# Patient Record
Sex: FEMALE | Race: WHITE | NOT HISPANIC OR LATINO
[De-identification: names, ages, dates, MRNs, and addresses within clinical notes are randomized per-mention and may not be internally consistent; named-entity substitution may affect disease eponyms.]

---

## 2023-01-01 ENCOUNTER — NON-APPOINTMENT (OUTPATIENT)
Age: 78
End: 2023-01-01

## 2023-01-01 ENCOUNTER — APPOINTMENT (OUTPATIENT)
Dept: ORTHOPEDIC SURGERY | Facility: CLINIC | Age: 78
End: 2023-01-01

## 2023-01-01 ENCOUNTER — APPOINTMENT (OUTPATIENT)
Dept: CARDIOLOGY | Facility: CLINIC | Age: 78
End: 2023-01-01
Payer: MEDICARE

## 2023-01-01 ENCOUNTER — APPOINTMENT (OUTPATIENT)
Dept: CARDIOLOGY | Facility: CLINIC | Age: 78
End: 2023-01-01

## 2023-01-01 ENCOUNTER — APPOINTMENT (OUTPATIENT)
Dept: NEUROLOGY | Facility: CLINIC | Age: 78
End: 2023-01-01

## 2023-01-01 ENCOUNTER — APPOINTMENT (OUTPATIENT)
Dept: PODIATRY | Facility: CLINIC | Age: 78
End: 2023-01-01

## 2023-01-01 VITALS
SYSTOLIC BLOOD PRESSURE: 142 MMHG | HEART RATE: 109 BPM | HEIGHT: 66 IN | DIASTOLIC BLOOD PRESSURE: 80 MMHG | BODY MASS INDEX: 28.77 KG/M2 | WEIGHT: 179 LBS | OXYGEN SATURATION: 98 %

## 2023-01-01 VITALS
DIASTOLIC BLOOD PRESSURE: 60 MMHG | BODY MASS INDEX: 29.09 KG/M2 | HEIGHT: 66 IN | WEIGHT: 181 LBS | HEART RATE: 84 BPM | SYSTOLIC BLOOD PRESSURE: 112 MMHG | OXYGEN SATURATION: 96 %

## 2023-01-01 DIAGNOSIS — Z83.3 FAMILY HISTORY OF DIABETES MELLITUS: ICD-10-CM

## 2023-01-01 DIAGNOSIS — I10 ESSENTIAL (PRIMARY) HYPERTENSION: ICD-10-CM

## 2023-01-01 DIAGNOSIS — E78.00 PURE HYPERCHOLESTEROLEMIA, UNSPECIFIED: ICD-10-CM

## 2023-01-01 DIAGNOSIS — G40.909 EPILEPSY, UNSPECIFIED, NOT INTRACTABLE, W/OUT STATUS EPILEPTICUS: ICD-10-CM

## 2023-01-01 DIAGNOSIS — Z82.61 FAMILY HISTORY OF ARTHRITIS: ICD-10-CM

## 2023-01-01 PROCEDURE — 93298 REM INTERROG DEV EVAL SCRMS: CPT

## 2023-01-01 PROCEDURE — 99215 OFFICE O/P EST HI 40 MIN: CPT | Mod: 25

## 2023-01-01 PROCEDURE — G2066: CPT

## 2023-01-01 PROCEDURE — 93000 ELECTROCARDIOGRAM COMPLETE: CPT

## 2023-01-01 PROCEDURE — 99214 OFFICE O/P EST MOD 30 MIN: CPT

## 2023-01-01 RX ORDER — AMLODIPINE BESYLATE 5 MG/1
5 TABLET ORAL
Qty: 30 | Refills: 0 | Status: ACTIVE | COMMUNITY
Start: 2023-01-01

## 2023-01-01 RX ORDER — RISPERIDONE 0.5 MG/1
0.5 TABLET, FILM COATED ORAL
Qty: 30 | Refills: 0 | Status: ACTIVE | COMMUNITY
Start: 2022-10-18

## 2023-01-01 RX ORDER — METOPROLOL SUCCINATE 50 MG/1
50 TABLET, EXTENDED RELEASE ORAL
Qty: 30 | Refills: 0 | Status: ACTIVE | COMMUNITY
Start: 2023-01-01

## 2023-01-01 RX ORDER — FOLIC ACID 1 MG/1
1 TABLET ORAL
Qty: 90 | Refills: 3 | Status: ACTIVE | COMMUNITY
Start: 2023-01-01

## 2023-01-01 RX ORDER — DIAZEPAM 7.5 MG/100UL
7.5 SPRAY NASAL
Qty: 2 | Refills: 0 | Status: ACTIVE | COMMUNITY
Start: 2023-01-01

## 2023-01-01 RX ORDER — DIAZEPAM 10 MG/2ML
10 GEL RECTAL
Qty: 5 | Refills: 0 | Status: ACTIVE | COMMUNITY
Start: 2022-01-01

## 2023-01-01 RX ORDER — CHOLECALCIFEROL (VITAMIN D3)
CRYSTALS MISCELLANEOUS
Refills: 0 | Status: ACTIVE | COMMUNITY
Start: 2023-01-01

## 2023-01-01 RX ORDER — CLOBAZAM 10 MG/1
10 TABLET ORAL DAILY
Refills: 0 | Status: ACTIVE | COMMUNITY

## 2023-01-01 RX ORDER — KRILL/OM-3/DHA/EPA/PHOSPHO/AST 1000-230MG
81 CAPSULE ORAL
Refills: 0 | Status: ACTIVE | COMMUNITY
Start: 2023-01-01

## 2023-01-01 RX ORDER — ATORVASTATIN CALCIUM 20 MG/1
20 TABLET, FILM COATED ORAL
Qty: 90 | Refills: 1 | Status: ACTIVE | COMMUNITY
Start: 2023-01-01

## 2023-01-01 RX ORDER — DIVALPROEX SODIUM 250 MG/1
250 TABLET, DELAYED RELEASE ORAL
Qty: 180 | Refills: 0 | Status: ACTIVE | COMMUNITY
Start: 2023-01-01

## 2023-03-20 PROBLEM — Z82.61 FAMILY HISTORY OF ARTHRITIS: Status: ACTIVE | Noted: 2023-01-01

## 2023-03-20 PROBLEM — Z83.3 FAMILY HISTORY OF DIABETES MELLITUS: Status: ACTIVE | Noted: 2023-01-01

## 2023-03-20 NOTE — HISTORY OF PRESENT ILLNESS
[FreeTextEntry1] : Bhakti Barnhart is a 78-year-old female with history of memory loss, seizure, HTN, HL.\par \par Patient seen in the office today with her  who was able to confirm accurate history.  Patient is currently living in [facility] assisted living.\par \par Patient admitted [facility] 2/1/2023 to 2/6/2023 seizure versus syncope.  EKG sinus rhythm age-indeterminate for MI.  Echocardiogram LVEF 65%, mild MR, ascending aorta 4.0 cm.  Reveal Linq ILR implanted 2/2/2020.  Up to current date ILR no AF or significant pauses. \par \par No history of CAD, MI, revascularization, VHD, CHF, TIA, CVA, diabetes, PVD, DVT, PE, arrhythmia, AF.\par \par EKG March 2023, sinus rhythm LVH, age-indeterminate inferior MI, PRWP\par \par

## 2023-03-20 NOTE — ASSESSMENT
[FreeTextEntry1] : Bhakti Barnhart is an 78-year-old female with medical history detailed above and active medical issues including:\par \par - [facility] admission Feb 2023 seizure versus syncope.  Sinus rhythm without evidence of arrhythmia, no orthostatic BP changes.  Patient following up with neurologist for seizure.  ILR implanted Feb 2023 sinus rhythm without significant arrhythmia. \par \par - Hypertension, average resting BPs at guideline goal on amlodipine.\par \par - Hyperlipidemia on atorvastatin well-tolerated\par \par - History of memory loss,  office visit today with  who was able to confirm accurate history. \par \par Advised patient to follow active lifestyle with regular cardiovascular exercise. Patient educated on heart healthy diet. Recommend increased oral hydration with electrolyte suppliment drinks, avoid excess alcohol and caffeine.  Patient is aware to call with any symptoms or concerns. \par \par Cardiology follow-up 3 months.  Current cardiac medications remain unchanged and renewals  are up to date. Repeat labs will be ordered with PMD.\par \par Bhakti Barnhart will follow-up with PCP for primary care\par

## 2023-06-14 PROBLEM — I10 BENIGN ESSENTIAL HYPERTENSION: Status: ACTIVE | Noted: 2023-01-01

## 2023-06-14 PROBLEM — G40.909 EPILEPSY: Status: ACTIVE | Noted: 2023-01-01

## 2023-06-14 PROBLEM — E78.00 HIGH CHOLESTEROL: Status: ACTIVE | Noted: 2023-01-01

## 2023-06-21 NOTE — HISTORY OF PRESENT ILLNESS
[FreeTextEntry1] : Bhakti Barnhart is a 78-year-old female with history of memory loss, seizure, HTN, HL, ILR implant May 2023.\par \par Patient seen in the office today with her  who was able to confirm accurate history.  Patient is currently living in [facility] assisted living.\par \par Patient admitted [facility] 2/1/2023 to 2/6/2023 seizure versus syncope.  EKG sinus rhythm age-indeterminate for MI.  Echocardiogram LVEF 65%, mild MR, ascending aorta 4.0 cm.  Reveal Linq ILR implanted 2/2/2020.  Up to current date ILR no AF or significant pauses.\par \par Revealing ILR no events up to current date\par \par Echocardiogram Feb 2023 [facility], LVEF 65%, mild MR.\par \par No history of CAD, MI, revascularization, VHD, CHF, TIA, CVA, diabetes, PVD, DVT, PE, arrhythmia, AF.\par \par EKG March 2023, sinus rhythm LVH, age-indeterminate inferior MI, PRWP\par \par

## 2023-06-21 NOTE — ASSESSMENT
[FreeTextEntry1] : Bhakti Barnhart is an 78-year-old female with medical history detailed above and active medical issues including:\par \par - [facility] admission Feb 2023 seizure versus syncope.  Sinus rhythm without evidence of arrhythmia, no orthostatic BP changes.  Patient following up with neurologist for seizure.  ILR implanted Feb 2023 sinus rhythm without significant arrhythmia. \par \par - Hypertension, average resting BPs at guideline goal on amlodipine.\par \par - Hyperlipidemia on atorvastatin well-tolerated\par \par - History of memory loss,  office visit today with  who was able to confirm accurate history. \par \par Advised patient to follow active lifestyle with regular cardiovascular exercise. Patient educated on heart healthy diet. Recommend increased oral hydration with electrolyte suppliment drinks, avoid excess alcohol and caffeine.  Patient is aware to call with any symptoms or concerns. \par \par Cardiology follow-up 6 months with echocardiogram.  Current cardiac medications remain unchanged and renewals  are up to date. Repeat labs will be ordered with PMD.\par \par Bhakti Barnhart will follow-up with PCP for primary care\par

## 2023-12-12 ENCOUNTER — APPOINTMENT (OUTPATIENT)
Dept: CARDIOLOGY | Facility: CLINIC | Age: 78
End: 2023-12-12

## 2024-01-08 ENCOUNTER — APPOINTMENT (OUTPATIENT)
Dept: CARDIOLOGY | Facility: CLINIC | Age: 79
End: 2024-01-08